# Patient Record
Sex: FEMALE | Race: WHITE | HISPANIC OR LATINO | URBAN - METROPOLITAN AREA
[De-identification: names, ages, dates, MRNs, and addresses within clinical notes are randomized per-mention and may not be internally consistent; named-entity substitution may affect disease eponyms.]

---

## 2019-03-08 ENCOUNTER — OFFICE (OUTPATIENT)
Dept: URBAN - METROPOLITAN AREA CLINIC 101 | Facility: CLINIC | Age: 44
End: 2019-03-08

## 2019-03-08 VITALS
TEMPERATURE: 98.6 F | HEIGHT: 59 IN | HEART RATE: 90 BPM | DIASTOLIC BLOOD PRESSURE: 81 MMHG | WEIGHT: 132 LBS | SYSTOLIC BLOOD PRESSURE: 111 MMHG

## 2019-03-08 DIAGNOSIS — K21.9 GASTRO-ESOPHAGEAL REFLUX DISEASE WITHOUT ESOPHAGITIS: ICD-10-CM

## 2019-03-08 DIAGNOSIS — R06.89 OTHER ABNORMALITIES OF BREATHING: ICD-10-CM

## 2019-03-08 PROCEDURE — 99204 OFFICE O/P NEW MOD 45 MIN: CPT

## 2019-03-08 NOTE — SERVICEHPINOTES
DENISE WRIGHT   is a   43  female who presents with globus sensation. The patient is originally [facility]. Feels like there is something stuck in the throat. Has been experiencing this over 2 months. Occasional trouble swallowing at mid sternum with solids. Pain with drinking hot beverages. + Acid reflux, started taking pantoprazole 40 mg since last week and acid reflux was improved. + epigastric pain with non controlled acid reflux.  Denies nausea, vomiting, dyspepsia, early satiety or weight loss. BRReports having a bowel movement daily on BSS type 3-4. Denies blood in stool, melena, diarrhea or lower abdominal pain. Occasional constipation. Denies family history of colon cancer. Father has a history of colon polyps in 50's. BRDenies taking NSAIDs. Never had an EGD or colonoscopy in the past. Denies chest pain, palpitations or sob with exertion. BR

## 2019-03-14 ENCOUNTER — OFFICE (OUTPATIENT)
Dept: URBAN - METROPOLITAN AREA CLINIC 100 | Facility: CLINIC | Age: 44
End: 2019-03-14
Payer: COMMERCIAL

## 2019-03-14 VITALS
HEART RATE: 94 BPM | SYSTOLIC BLOOD PRESSURE: 100 MMHG | HEART RATE: 96 BPM | OXYGEN SATURATION: 93 % | DIASTOLIC BLOOD PRESSURE: 71 MMHG | SYSTOLIC BLOOD PRESSURE: 92 MMHG | DIASTOLIC BLOOD PRESSURE: 68 MMHG | DIASTOLIC BLOOD PRESSURE: 75 MMHG | DIASTOLIC BLOOD PRESSURE: 60 MMHG | RESPIRATION RATE: 19 BRPM | DIASTOLIC BLOOD PRESSURE: 73 MMHG | RESPIRATION RATE: 10 BRPM | OXYGEN SATURATION: 98 % | HEIGHT: 59 IN | SYSTOLIC BLOOD PRESSURE: 103 MMHG | OXYGEN SATURATION: 92 % | SYSTOLIC BLOOD PRESSURE: 105 MMHG | WEIGHT: 132 LBS | TEMPERATURE: 97.2 F | HEART RATE: 80 BPM | OXYGEN SATURATION: 100 % | HEART RATE: 74 BPM | SYSTOLIC BLOOD PRESSURE: 107 MMHG | RESPIRATION RATE: 16 BRPM | TEMPERATURE: 98.1 F | HEART RATE: 70 BPM

## 2019-03-14 DIAGNOSIS — B96.81 HELICOBACTER PYLORI [H. PYLORI] AS THE CAUSE OF DISEASES CLA: ICD-10-CM

## 2019-03-14 DIAGNOSIS — K29.60 OTHER GASTRITIS WITHOUT BLEEDING: ICD-10-CM

## 2019-03-14 DIAGNOSIS — R06.89 OTHER ABNORMALITIES OF BREATHING: ICD-10-CM

## 2019-03-14 DIAGNOSIS — K21.9 GASTRO-ESOPHAGEAL REFLUX DISEASE WITHOUT ESOPHAGITIS: ICD-10-CM

## 2019-03-14 PROCEDURE — 43239 EGD BIOPSY SINGLE/MULTIPLE: CPT

## 2019-04-26 ENCOUNTER — OFFICE (OUTPATIENT)
Dept: URBAN - METROPOLITAN AREA CLINIC 101 | Facility: CLINIC | Age: 44
End: 2019-04-26

## 2019-04-26 VITALS
HEART RATE: 91 BPM | TEMPERATURE: 98.1 F | DIASTOLIC BLOOD PRESSURE: 76 MMHG | HEIGHT: 59 IN | WEIGHT: 131 LBS | SYSTOLIC BLOOD PRESSURE: 103 MMHG

## 2019-04-26 DIAGNOSIS — B96.81 HELICOBACTER PYLORI [H. PYLORI] AS THE CAUSE OF DISEASES CLA: ICD-10-CM

## 2019-04-26 PROCEDURE — 99213 OFFICE O/P EST LOW 20 MIN: CPT

## 2019-04-26 NOTE — SERVICEHPINOTES
DENISE VEGA   is a   43  female who presents for follow up. EGD 03/14/19 showed mild chronic active gastritis with h pylori. Currently taking the prevpac. Today is the last day. BRFeels well since started taking the medications. Denies further glubus sensation or acid reflux.  Denies further episode of dysphagia. Bowel movement is regular up to 3 times daily on BSS type 4. Denies blood in stool, melena, constipation, diarrhea or abdominal pain. Has been taking Humira and methotrexate for RA, sees Dr. Naidu. BR

## 2019-05-18 LAB
H PYLORI BREATH TEST: NEGATIVE
H. PYLORI BREATH COLLECTION: (no result)

## 2021-09-24 ENCOUNTER — OFFICE (OUTPATIENT)
Dept: URBAN - METROPOLITAN AREA CLINIC 102 | Facility: CLINIC | Age: 46
End: 2021-09-24

## 2021-09-24 VITALS
TEMPERATURE: 99.3 F | WEIGHT: 118 LBS | HEIGHT: 59 IN | HEART RATE: 84 BPM | SYSTOLIC BLOOD PRESSURE: 119 MMHG | DIASTOLIC BLOOD PRESSURE: 80 MMHG

## 2021-09-24 DIAGNOSIS — R19.7 DIARRHEA, UNSPECIFIED: ICD-10-CM

## 2021-09-24 DIAGNOSIS — K52.9 NONINFECTIVE GASTROENTERITIS AND COLITIS, UNSPECIFIED: ICD-10-CM

## 2021-09-24 DIAGNOSIS — K92.1 MELENA: ICD-10-CM

## 2021-09-24 PROCEDURE — 99214 OFFICE O/P EST MOD 30 MIN: CPT

## 2021-09-24 NOTE — SERVICEHPINOTES
DENISE WRIGHT   is a   45  female who complains of colitis.
brOn 9/18 she acutely developed body aches and a fever then the next day started having diarrhea. Diarrhea persisted and she has had some blood in stools which is what prompted her to go to ER ([facility]) She did have a neg covid test. 
br CT on 9/21 showed findings of distal colitis (distal descending and sigmoid colon) also abnormal thickening of the distal stomach. Labs were unremarkable w/ normal WBCs. 
br
She was given bentyl, augmentin x5 days (also given omeprazole rx but she did not ). She has continued to have diarrhea despite antibiotics. No abdominal pain, however. Nausea several days ago but no vomiting. Sx are not waking her from sleep. She did have chipotle hours prior to symptom onset although her mother had the same food and has not had any symptoms. She has never had a colonoscopy. She states her father and sister have a colon condition but not sure exactly what it is (not sure if it is IBD). No family hx of colon cancer. 
br She had an EGD in 2019 which showed +h pylori with breath test following treatment confirming eradication. She no longer gets regular GERD sx. No dysphagia. No regular NSAIDs. br visited="true"

## 2021-11-05 ENCOUNTER — OFFICE (OUTPATIENT)
Dept: URBAN - METROPOLITAN AREA CLINIC 98 | Facility: CLINIC | Age: 46
End: 2021-11-05
Payer: COMMERCIAL

## 2021-11-05 VITALS
HEART RATE: 74 BPM | SYSTOLIC BLOOD PRESSURE: 112 MMHG | RESPIRATION RATE: 18 BRPM | OXYGEN SATURATION: 98 % | OXYGEN SATURATION: 99 % | HEART RATE: 79 BPM | RESPIRATION RATE: 21 BRPM | HEART RATE: 87 BPM | TEMPERATURE: 98.2 F | OXYGEN SATURATION: 100 % | RESPIRATION RATE: 8 BRPM | DIASTOLIC BLOOD PRESSURE: 77 MMHG | SYSTOLIC BLOOD PRESSURE: 95 MMHG | DIASTOLIC BLOOD PRESSURE: 69 MMHG | SYSTOLIC BLOOD PRESSURE: 121 MMHG | HEART RATE: 67 BPM | DIASTOLIC BLOOD PRESSURE: 73 MMHG | HEART RATE: 61 BPM | RESPIRATION RATE: 12 BRPM | DIASTOLIC BLOOD PRESSURE: 62 MMHG | WEIGHT: 116 LBS | SYSTOLIC BLOOD PRESSURE: 120 MMHG | SYSTOLIC BLOOD PRESSURE: 102 MMHG | HEART RATE: 70 BPM | SYSTOLIC BLOOD PRESSURE: 110 MMHG | TEMPERATURE: 97.3 F | HEART RATE: 76 BPM | HEART RATE: 62 BPM | DIASTOLIC BLOOD PRESSURE: 76 MMHG | HEIGHT: 59 IN | DIASTOLIC BLOOD PRESSURE: 67 MMHG | RESPIRATION RATE: 17 BRPM | SYSTOLIC BLOOD PRESSURE: 124 MMHG | RESPIRATION RATE: 16 BRPM | DIASTOLIC BLOOD PRESSURE: 60 MMHG

## 2021-11-05 DIAGNOSIS — R19.7 DIARRHEA, UNSPECIFIED: ICD-10-CM

## 2021-11-05 DIAGNOSIS — R93.3 ABNORMAL FINDINGS ON DIAGNOSTIC IMAGING OF OTHER PARTS OF DI: ICD-10-CM

## 2021-11-05 DIAGNOSIS — D12.5 BENIGN NEOPLASM OF SIGMOID COLON: ICD-10-CM

## 2021-11-05 PROBLEM — K63.5 POLYP OF COLON: Status: ACTIVE | Noted: 2021-11-05

## 2021-11-05 LAB
GI LOWER HISTOLOGY - SPECM 1 JAR(S): 3: (no result)
GI LOWER POLYPECTOMY EXCISION - SPECM 1 JAR(S): 1: (no result)
GI LOWER POLYPECTOMY EXCISION - SPECM 1 JAR(S): 1: PDF REPORT: (no result)